# Patient Record
Sex: MALE | Race: WHITE | NOT HISPANIC OR LATINO | URBAN - METROPOLITAN AREA
[De-identification: names, ages, dates, MRNs, and addresses within clinical notes are randomized per-mention and may not be internally consistent; named-entity substitution may affect disease eponyms.]

---

## 2018-02-22 NOTE — PATIENT DISCUSSION
MYOPIA OU- DISC OPT OF REFRACTIVE [id number]. PT UNDERSTANDS OPTIONS AND DESIRES TO PROCEED WITH LASIK OU TO IMPROVE VA AND REDUCE DEPENDENCY ON GLS/CTLS.

## 2019-03-15 NOTE — PATIENT DISCUSSION
It was discussed and explained that monovision is a compromise and that vision will be limited during certain activities. Activities include: driving at night and near tasks that require good depth perception. Recommended observation. Post op 1 week.

## 2020-08-24 ENCOUNTER — OFFICE VISIT (OUTPATIENT)
Dept: URBAN - METROPOLITAN AREA TELEHEALTH 2 | Facility: TELEHEALTH | Age: 70
End: 2020-08-24

## 2020-08-24 ENCOUNTER — OFFICE VISIT (OUTPATIENT)
Dept: URBAN - METROPOLITAN AREA CLINIC 63 | Facility: CLINIC | Age: 70
End: 2020-08-24

## 2020-09-09 ENCOUNTER — OFFICE VISIT (OUTPATIENT)
Dept: URBAN - METROPOLITAN AREA SURGERY CENTER 4 | Facility: SURGERY CENTER | Age: 70
End: 2020-09-09

## 2020-09-11 LAB — PATHOLOGY (INDENTED REPORT): (no result)

## 2020-09-30 ENCOUNTER — OFFICE VISIT (OUTPATIENT)
Dept: URBAN - METROPOLITAN AREA SURGERY CENTER 4 | Facility: SURGERY CENTER | Age: 70
End: 2020-09-30

## 2020-10-20 ENCOUNTER — OFFICE VISIT (OUTPATIENT)
Dept: URBAN - METROPOLITAN AREA TELEHEALTH 2 | Facility: TELEHEALTH | Age: 70
End: 2020-10-20

## 2021-11-16 ENCOUNTER — OFFICE VISIT (OUTPATIENT)
Dept: URBAN - METROPOLITAN AREA CLINIC 121 | Facility: CLINIC | Age: 71
End: 2021-11-16

## 2022-01-24 ENCOUNTER — OFFICE VISIT (OUTPATIENT)
Dept: URBAN - METROPOLITAN AREA CLINIC 63 | Facility: CLINIC | Age: 72
End: 2022-01-24

## 2022-01-27 ENCOUNTER — OFFICE VISIT (OUTPATIENT)
Dept: URBAN - METROPOLITAN AREA TELEHEALTH 2 | Facility: TELEHEALTH | Age: 72
End: 2022-01-27

## 2022-03-23 ENCOUNTER — OFFICE VISIT (OUTPATIENT)
Dept: URBAN - METROPOLITAN AREA SURGERY CENTER 4 | Facility: SURGERY CENTER | Age: 72
End: 2022-03-23

## 2022-04-07 ENCOUNTER — OFFICE VISIT (OUTPATIENT)
Dept: URBAN - METROPOLITAN AREA TELEHEALTH 2 | Facility: TELEHEALTH | Age: 72
End: 2022-04-07

## 2022-05-06 ENCOUNTER — OFFICE VISIT (OUTPATIENT)
Dept: URBAN - METROPOLITAN AREA MEDICAL CENTER 14 | Facility: MEDICAL CENTER | Age: 72
End: 2022-05-06

## 2022-05-19 ENCOUNTER — OFFICE VISIT (OUTPATIENT)
Dept: URBAN - METROPOLITAN AREA TELEHEALTH 2 | Facility: TELEHEALTH | Age: 72
End: 2022-05-19

## 2022-06-16 ENCOUNTER — OFFICE VISIT (OUTPATIENT)
Dept: URBAN - METROPOLITAN AREA TELEHEALTH 2 | Facility: TELEHEALTH | Age: 72
End: 2022-06-16

## 2022-07-09 ENCOUNTER — TELEPHONE ENCOUNTER (OUTPATIENT)
Dept: URBAN - METROPOLITAN AREA CLINIC 121 | Facility: CLINIC | Age: 72
End: 2022-07-09

## 2022-07-09 RX ORDER — FAMOTIDINE 10 MG
TABLET ORAL
Refills: 0 | OUTPATIENT
Start: 2015-09-24 | End: 2017-01-25

## 2022-07-09 RX ORDER — FAMOTIDINE 10 MG
TABLET ORAL
Refills: 0 | OUTPATIENT
Start: 2015-06-08 | End: 2015-07-10

## 2022-07-09 RX ORDER — FLUOXETINE HYDROCHLORIDE 40 MG/1
CAPSULE ORAL ONCE A DAY
Refills: 0 | OUTPATIENT
Start: 2022-01-27 | End: 2022-04-07

## 2022-07-09 RX ORDER — FLUOXETINE HYDROCHLORIDE 20 MG/1
CAPSULE ORAL ONCE A DAY
Refills: 0 | OUTPATIENT
Start: 2017-01-25 | End: 2017-03-06

## 2022-07-09 RX ORDER — FLUOXETINE HCL 10 MG
CAPSULE ORAL
Refills: 0 | OUTPATIENT
Start: 2012-12-04 | End: 2015-04-14

## 2022-07-09 RX ORDER — SIMVASTATIN 20 MG/1
TABLET, FILM COATED ORAL
Refills: 0 | OUTPATIENT
Start: 2015-07-10 | End: 2015-09-24

## 2022-07-09 RX ORDER — FAMOTIDINE 10 MG
TABLET ORAL ONCE A DAY
Refills: 0 | OUTPATIENT
Start: 2017-01-25 | End: 2017-03-06

## 2022-07-09 RX ORDER — SIMVASTATIN 20 MG/1
TABLET, FILM COATED ORAL ONCE A DAY
Refills: 0 | OUTPATIENT
Start: 2017-01-25 | End: 2017-03-06

## 2022-07-09 RX ORDER — OMEPRAZOLE 20 MG/1
TAKE 2 TABS PO QAM 30-60 MINS BEFORE BREAKFAST TABLET, DELAYED RELEASE ORAL
Refills: 3 | OUTPATIENT
Start: 2017-01-25 | End: 2017-01-25

## 2022-07-09 RX ORDER — FAMOTIDINE 20 MG/1
TABLET ORAL ONCE A DAY
Refills: 0 | OUTPATIENT
Start: 2022-04-07 | End: 2022-06-16

## 2022-07-09 RX ORDER — AVOBENZONE, HOMOSALATE, OCTISALATE, OCTOCRYLENE 30; 100; 50; 100 MG/ML; MG/ML; MG/ML; MG/ML
LOTION TOPICAL ONCE A DAY
Refills: 0 | OUTPATIENT
Start: 2017-03-06 | End: 2017-03-23

## 2022-07-09 RX ORDER — OMEPRAZOLE 20 MG/1
TAKE 2 TABS PO QAM 30-60 MINS BEFORE BREAKFAST CAPSULE, DELAYED RELEASE ORAL
Refills: 3 | OUTPATIENT
Start: 2014-04-30 | End: 2014-09-23

## 2022-07-09 RX ORDER — FLUOXETINE HCL 20 MG
CAPSULE ORAL
Refills: 0 | OUTPATIENT
Start: 2015-07-10 | End: 2015-09-24

## 2022-07-09 RX ORDER — OMEPRAZOLE 20 MG/1
TABLET, DELAYED RELEASE ORAL TWICE A DAY
Refills: 0 | OUTPATIENT
Start: 2015-02-07 | End: 2015-07-10

## 2022-07-09 RX ORDER — OMEPRAZOLE 20 MG/1
TABLET, DELAYED RELEASE ORAL TWICE A DAY
Refills: 0 | OUTPATIENT
Start: 2015-07-10 | End: 2015-09-24

## 2022-07-09 RX ORDER — FLUOXETINE HYDROCHLORIDE 20 MG/1
CAPSULE ORAL ONCE A DAY
Refills: 0 | OUTPATIENT
Start: 2017-03-06 | End: 2017-03-23

## 2022-07-09 RX ORDER — FLUOXETINE HCL 20 MG
CAPSULE ORAL
Refills: 0 | OUTPATIENT
Start: 2015-09-24 | End: 2017-01-25

## 2022-07-09 RX ORDER — FLUOXETINE HYDROCHLORIDE 40 MG/1
CAPSULE ORAL
Refills: 0 | OUTPATIENT
Start: 2015-03-14 | End: 2015-06-08

## 2022-07-09 RX ORDER — FLUOXETINE HYDROCHLORIDE 40 MG/1
CAPSULE ORAL ONCE A DAY
Refills: 0 | OUTPATIENT
Start: 2022-04-07 | End: 2022-06-16

## 2022-07-09 RX ORDER — FAMOTIDINE 10 MG
TABLET ORAL ONCE A DAY
Refills: 0 | OUTPATIENT
Start: 2017-03-23 | End: 2017-03-23

## 2022-07-09 RX ORDER — OMEGA-3S/DHA/EPA/FISH OIL 980-1400MG
CAPSULE,DELAYED RELEASE (ENTERIC COATED) ORAL
Refills: 0 | OUTPATIENT
Start: 2015-07-10 | End: 2015-07-10

## 2022-07-09 RX ORDER — FAMOTIDINE 20 MG/1
TABLET ORAL ONCE A DAY
Refills: 0 | OUTPATIENT
Start: 2022-01-27 | End: 2022-04-07

## 2022-07-09 RX ORDER — ASPIRIN 81 MG/1
TABLET, DELAYED RELEASE ORAL ONCE A DAY
Refills: 0 | OUTPATIENT
Start: 2017-03-06 | End: 2017-03-06

## 2022-07-09 RX ORDER — OMEPRAZOLE 20 MG/1
TAKE 2 TABS PO QAM 30-60 MINS BEFORE BREAKFAST CAPSULE, DELAYED RELEASE ORAL
Refills: 3 | OUTPATIENT
Start: 2014-09-23 | End: 2015-06-08

## 2022-07-09 RX ORDER — SIMVASTATIN 20 MG/1
TABLET, FILM COATED ORAL
Refills: 0 | OUTPATIENT
Start: 2015-06-08 | End: 2015-07-10

## 2022-07-09 RX ORDER — FAMOTIDINE 10 MG
TABLET ORAL
Refills: 0 | OUTPATIENT
Start: 2014-04-21 | End: 2015-06-08

## 2022-07-09 RX ORDER — SIMVASTATIN 20 MG/1
TABLET, FILM COATED ORAL ONCE A DAY
Refills: 0 | OUTPATIENT
Start: 2017-03-06 | End: 2017-03-06

## 2022-07-09 RX ORDER — OMEGA-3S/DHA/EPA/FISH OIL 980-1400MG
CAPSULE,DELAYED RELEASE (ENTERIC COATED) ORAL
Refills: 0 | OUTPATIENT
Start: 2015-06-08 | End: 2015-07-10

## 2022-07-09 RX ORDER — FAMOTIDINE 10 MG
TABLET ORAL ONCE A DAY
Refills: 0 | OUTPATIENT
Start: 2017-03-06 | End: 2017-03-23

## 2022-07-09 RX ORDER — OMEPRAZOLE 20 MG/1
TWICE A DAY TABLET, DELAYED RELEASE ORAL TWICE A DAY
Refills: 3 | OUTPATIENT
Start: 2015-08-18 | End: 2017-03-06

## 2022-07-09 RX ORDER — FAMOTIDINE 10 MG/1
TABLET, FILM COATED ORAL ONCE A DAY
Refills: 0 | OUTPATIENT
Start: 2022-01-27 | End: 2022-01-27

## 2022-07-09 RX ORDER — FLUOXETINE HCL 10 MG
CAPSULE ORAL TAKE AS DIRECTED
Refills: 0 | OUTPATIENT
Start: 2011-03-29 | End: 2012-12-04

## 2022-07-09 RX ORDER — ASPIRIN 81 MG/1
TABLET, DELAYED RELEASE ORAL ONCE A DAY
Refills: 0 | OUTPATIENT
Start: 2017-01-25 | End: 2017-03-06

## 2022-07-09 RX ORDER — AVOBENZONE, HOMOSALATE, OCTISALATE, OCTOCRYLENE 30; 100; 50; 100 MG/ML; MG/ML; MG/ML; MG/ML
LOTION TOPICAL ONCE A DAY
Refills: 0 | OUTPATIENT
Start: 2017-03-23 | End: 2018-05-15

## 2022-07-09 RX ORDER — FAMOTIDINE 10 MG/1
TABLET, FILM COATED ORAL ONCE A DAY
Refills: 0 | OUTPATIENT
Start: 2020-08-24 | End: 2022-01-27

## 2022-07-09 RX ORDER — OMEPRAZOLE 20 MG/1
TAKE 2 TABS PO QAM 30-60 MINS BEFORE BREAKFAST CAPSULE, DELAYED RELEASE ORAL
Refills: 3 | OUTPATIENT
Start: 2013-03-27 | End: 2014-04-30

## 2022-07-09 RX ORDER — OMEGA-3S/DHA/EPA/FISH OIL 980-1400MG
CAPSULE,DELAYED RELEASE (ENTERIC COATED) ORAL ONCE A DAY
Refills: 0 | OUTPATIENT
Start: 2015-07-10 | End: 2015-09-24

## 2022-07-09 RX ORDER — FLUOXETINE HCL 20 MG
CAPSULE ORAL
Refills: 0 | OUTPATIENT
Start: 2015-06-08 | End: 2015-07-10

## 2022-07-09 RX ORDER — OMEPRAZOLE MAGNESIUM 10 MG/1
GRANULE, DELAYED RELEASE ORAL ONCE A DAY
Refills: 0 | OUTPATIENT
Start: 2012-01-26 | End: 2012-12-04

## 2022-07-09 RX ORDER — SUCRALFATE 1 G/10ML
TAKE 10ML PO QID, AC & HS X 1 MONTH SUSPENSION ORAL
Refills: 0 | OUTPATIENT
Start: 2015-06-04 | End: 2015-06-08

## 2022-07-09 RX ORDER — HYDROCORTISONE ACETATE 25 MG/1
SUPPOSITORY RECTAL TWICE A DAY
Refills: 0 | OUTPATIENT
Start: 2012-12-03 | End: 2015-04-14

## 2022-07-09 RX ORDER — FLUOXETINE HYDROCHLORIDE 20 MG/1
CAPSULE ORAL ONCE A DAY
Refills: 0 | OUTPATIENT
Start: 2017-03-23 | End: 2018-05-15

## 2022-07-09 RX ORDER — SIMVASTATIN 20 MG/1
TABLET, FILM COATED ORAL
Refills: 0 | OUTPATIENT
Start: 2015-04-14 | End: 2015-06-08

## 2022-07-09 RX ORDER — FAMOTIDINE 10 MG
TABLET ORAL
Refills: 0 | OUTPATIENT
Start: 2015-07-10 | End: 2015-09-24

## 2022-07-09 RX ORDER — ALUMINUM HYDROXIDE AND MAGNESIUM CARBONATE 254; 237.5 MG/5ML; MG/5ML
LIQUID ORAL AS NEEDED
Refills: 0 | OUTPATIENT
Start: 2018-05-15 | End: 2020-08-24

## 2022-07-09 RX ORDER — FAMOTIDINE 10 MG
TABLET ORAL TWICE A DAY
Refills: 0 | OUTPATIENT
Start: 2018-05-15 | End: 2020-08-24

## 2022-07-09 RX ORDER — SUCRALFATE 1 G/10ML
TAKE 10ML PO QID, AC & HS X 1 MONTH SUSPENSION ORAL
Refills: 0 | OUTPATIENT
Start: 2015-06-04 | End: 2015-06-04

## 2022-07-09 RX ORDER — OMEPRAZOLE 40 MG/1
TAKE 1 TAB PO QAM 30-60 MINS BEFORE BREAKFAST CAPSULE, DELAYED RELEASE ORAL ONCE A DAY
Refills: 1 | OUTPATIENT
Start: 2018-07-03 | End: 2018-08-27

## 2022-07-09 RX ORDER — FLUOXETINE HYDROCHLORIDE 20 MG/1
CAPSULE ORAL ONCE A DAY
Refills: 0 | OUTPATIENT
Start: 2018-05-15 | End: 2022-01-27

## 2022-07-09 RX ORDER — MELOXICAM 15 MG
TABLET ORAL ONCE A DAY
Refills: 0 | OUTPATIENT
Start: 2020-08-24 | End: 2022-01-27

## 2022-07-09 RX ORDER — AVOBENZONE, HOMOSALATE, OCTISALATE, OCTOCRYLENE 30; 100; 50; 100 MG/ML; MG/ML; MG/ML; MG/ML
LOTION TOPICAL ONCE A DAY
Refills: 0 | OUTPATIENT
Start: 2017-01-25 | End: 2017-03-06

## 2022-07-10 ENCOUNTER — TELEPHONE ENCOUNTER (OUTPATIENT)
Dept: URBAN - METROPOLITAN AREA CLINIC 121 | Facility: CLINIC | Age: 72
End: 2022-07-10

## 2022-07-10 RX ORDER — SIMVASTATIN 20 MG/1
TABLET, FILM COATED ORAL
Refills: 0 | Status: ACTIVE | COMMUNITY
Start: 2015-09-24

## 2022-07-10 RX ORDER — OMEPRAZOLE 20 MG/1
TAKE 2 TABS QAM 30-60 MINS BEFORE BREAKFAST CAPSULE, DELAYED RELEASE ORAL
Refills: 3 | Status: ACTIVE | COMMUNITY
Start: 2013-01-08

## 2022-07-10 RX ORDER — SIMVASTATIN 10 MG/1
TABLET, FILM COATED ORAL
Refills: 0 | Status: ACTIVE | COMMUNITY
Start: 2014-03-26

## 2022-07-10 RX ORDER — HYDROCORTISONE ACETATE 25 MG/1
INSERT ONE SUPPOSITORY PER RECTUM TWICE DAILY AS NEEDED SUPPOSITORY RECTAL TWICE A DAY
Refills: 1 | Status: ACTIVE | COMMUNITY
Start: 2012-01-26

## 2022-07-10 RX ORDER — METRONIDAZOLE 500 MG/1
TABLET ORAL THREE TIMES A DAY
Refills: 0 | Status: ACTIVE | COMMUNITY
Start: 2012-12-04

## 2022-07-10 RX ORDER — CIPROFLOXACIN HYDROCHLORIDE 500 MG/1
TABLET, FILM COATED ORAL TWICE A DAY
Refills: 0 | Status: ACTIVE | COMMUNITY
Start: 2012-12-04

## 2022-07-10 RX ORDER — OMEPRAZOLE 20 MG/1
TABLET, DELAYED RELEASE ORAL TWICE A DAY
Refills: 0 | Status: ACTIVE | COMMUNITY
Start: 2015-09-24

## 2022-07-10 RX ORDER — OMEPRAZOLE 40 MG/1
TAKE ONE CAPSULE BY MOUTH EVERY MORNING 30-60MINS BEFORE BREAKFAST CAPSULE, DELAYED RELEASE ORAL
Refills: 3 | Status: ACTIVE | COMMUNITY
Start: 2018-08-27

## 2022-07-10 RX ORDER — SUCRALFATE 1 G/10ML
TAKE 10ML AC & HS X 1 MONTH SUSPENSION ORAL
Refills: 0 | Status: ACTIVE | COMMUNITY
Start: 2013-01-08

## 2022-07-10 RX ORDER — FAMOTIDINE 20 MG/1
TABLET ORAL ONCE A DAY
Refills: 0 | Status: ACTIVE | COMMUNITY
Start: 2022-06-16

## 2022-07-10 RX ORDER — FLUOXETINE HYDROCHLORIDE 40 MG/1
CAPSULE ORAL ONCE A DAY
Refills: 0 | Status: ACTIVE | COMMUNITY
Start: 2022-06-16

## 2022-07-10 RX ORDER — OMEPRAZOLE 20 MG/1
TAKE 2 TABS PO QAM 30-60 MINS BEFORE BREAKFAST TABLET, DELAYED RELEASE ORAL
Refills: 3 | Status: ACTIVE | COMMUNITY
Start: 2017-01-25

## 2022-11-02 ENCOUNTER — COMPREHENSIVE EXAM (OUTPATIENT)
Dept: URBAN - METROPOLITAN AREA CLINIC 28 | Facility: CLINIC | Age: 72
End: 2022-11-02

## 2022-11-02 DIAGNOSIS — H52.03: ICD-10-CM

## 2022-11-02 DIAGNOSIS — H25.013: ICD-10-CM

## 2022-11-02 DIAGNOSIS — H02.836: ICD-10-CM

## 2022-11-02 DIAGNOSIS — H02.833: ICD-10-CM

## 2022-11-02 PROCEDURE — 92014 COMPRE OPH EXAM EST PT 1/>: CPT

## 2022-11-02 PROCEDURE — 92015 DETERMINE REFRACTIVE STATE: CPT

## 2022-11-02 ASSESSMENT — VISUAL ACUITY
OD_CC: J1+-1
OS_CC: 20/20
OS_CC: J1+
OD_CC: 20/25-1

## 2022-11-02 ASSESSMENT — KERATOMETRY
OD_K1POWER_DIOPTERS: 46.25
OS_AXISANGLE2_DEGREES: 81
OD_AXISANGLE_DEGREES: 172
OD_K2POWER_DIOPTERS: 45.50
OS_AXISANGLE_DEGREES: 171
OD_AXISANGLE2_DEGREES: 82
OS_K2POWER_DIOPTERS: 45.75
OS_K1POWER_DIOPTERS: 46.25

## 2022-11-02 ASSESSMENT — TONOMETRY
OS_IOP_MMHG: 17
OD_IOP_MMHG: 17

## 2023-05-31 ENCOUNTER — DASHBOARD ENCOUNTERS (OUTPATIENT)
Age: 73
End: 2023-05-31

## 2023-05-31 ENCOUNTER — OFFICE VISIT (OUTPATIENT)
Dept: URBAN - METROPOLITAN AREA CLINIC 63 | Facility: CLINIC | Age: 73
End: 2023-05-31
Payer: MEDICARE

## 2023-05-31 ENCOUNTER — LAB OUTSIDE AN ENCOUNTER (OUTPATIENT)
Dept: URBAN - METROPOLITAN AREA CLINIC 63 | Facility: CLINIC | Age: 73
End: 2023-05-31

## 2023-05-31 ENCOUNTER — WEB ENCOUNTER (OUTPATIENT)
Dept: URBAN - METROPOLITAN AREA CLINIC 63 | Facility: CLINIC | Age: 73
End: 2023-05-31

## 2023-05-31 VITALS
WEIGHT: 166 LBS | BODY MASS INDEX: 24.59 KG/M2 | HEART RATE: 65 BPM | SYSTOLIC BLOOD PRESSURE: 124 MMHG | TEMPERATURE: 97.3 F | HEIGHT: 69 IN | OXYGEN SATURATION: 97 % | DIASTOLIC BLOOD PRESSURE: 80 MMHG

## 2023-05-31 DIAGNOSIS — K22.710 BARRETT'S ESOPHAGUS WITH LOW GRADE DYSPLASIA: ICD-10-CM

## 2023-05-31 PROBLEM — 1082771000119100: Status: ACTIVE | Noted: 2023-05-31

## 2023-05-31 PROCEDURE — 99214 OFFICE O/P EST MOD 30 MIN: CPT | Performed by: NURSE PRACTITIONER

## 2023-05-31 RX ORDER — FAMOTIDINE 20 MG/1
1 TABLET AT BEDTIME AS NEEDED TABLET, FILM COATED ORAL TWICE DAILY
Qty: 180 TABLET | Refills: 3 | OUTPATIENT
Start: 2023-05-31

## 2023-05-31 RX ORDER — FAMOTIDINE 20 MG/1
TABLET ORAL ONCE A DAY
Refills: 0 | Status: ACTIVE | COMMUNITY
Start: 2022-06-16

## 2023-05-31 RX ORDER — FLUOXETINE HYDROCHLORIDE 40 MG/1
CAPSULE ORAL ONCE A DAY
Refills: 0 | Status: ACTIVE | COMMUNITY
Start: 2022-06-16

## 2023-05-31 NOTE — HPI-PREVIOUS PROCEDURES
When last seen we reviewed his EGD performed in May 2022.  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  Mild gastritis was found and the ampulla again appeared prominent.    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 To review, he underwent further evaluation with imaging studies and a CT reported a questionable rounded filling defect in the region of the second portion of the duodenum. Ultimately he underwent EUS. This visualized a 9.2 x 5.4mm hypoechoic lesion in this area, although fine needle aspiration did not reveal any evidence of malignancy.  He was seen at the [facility] in August 2015. He was prepped for a polypectomy, but the endoscopist re-evaluated this with EUS and felt it represented a prominent ampulla and not a lesion. Repeat biopsies did not reveal any evidence of adenomatous tissue.  His last colonoscopy was in March 2022 and this revealed only left-sided diverticulosis and internal hemorrhoids..

## 2023-05-31 NOTE — HPI-TODAY'S VISIT:
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  He was last seen in June 2022.  Today he reports doing very well symptom-wise. He remains on Famotidine 20 mg daily and follows a strict antireflux diet and precautions.  He denies heartburn, reflux or dysphagia.  No appetite change or weight loss. No current bowel problems.   To review, he had been taking a daily PPI until 2018 when his ENT specialist changed him to Zantac 150mg bid. He was taking this until 2020 when he changed to Famotidine 20mg daily. He doesn't know why he hasn't been taking this twice daily or why the PPI was initially changed.

## 2023-06-15 ENCOUNTER — TELEPHONE ENCOUNTER (OUTPATIENT)
Dept: URBAN - METROPOLITAN AREA CLINIC 63 | Facility: CLINIC | Age: 73
End: 2023-06-15

## 2023-06-15 RX ORDER — FAMOTIDINE 20 MG/1
1 TABLET PO TWICE DAILY TABLET, FILM COATED ORAL TWICE DAILY
Qty: 180 TABLET | Refills: 3 | OUTPATIENT
Start: 2023-06-15

## 2023-10-27 NOTE — PHYSICAL EXAM CONSTITUTIONAL:
normal , alert , alert, well hydrated, in no acute distress Comment: Results are not available at this time. Will call patient once results are complete Render Risk Assessment In Note?: no Detail Level: Simple

## 2024-06-19 ENCOUNTER — TELEPHONE ENCOUNTER (OUTPATIENT)
Dept: URBAN - METROPOLITAN AREA CLINIC 64 | Facility: CLINIC | Age: 74
End: 2024-06-19

## 2024-07-09 ENCOUNTER — OFFICE VISIT (OUTPATIENT)
Dept: URBAN - METROPOLITAN AREA CLINIC 63 | Facility: CLINIC | Age: 74
End: 2024-07-09

## 2024-09-04 ENCOUNTER — COMPREHENSIVE EXAM (OUTPATIENT)
Dept: URBAN - METROPOLITAN AREA CLINIC 27 | Facility: CLINIC | Age: 74
End: 2024-09-04

## 2024-09-04 DIAGNOSIS — H35.362: ICD-10-CM

## 2024-09-04 DIAGNOSIS — H43.813: ICD-10-CM

## 2024-09-04 DIAGNOSIS — H25.813: ICD-10-CM

## 2024-09-04 DIAGNOSIS — H10.45: ICD-10-CM

## 2024-09-04 DIAGNOSIS — H52.223: ICD-10-CM

## 2024-09-04 DIAGNOSIS — H02.836: ICD-10-CM

## 2024-09-04 DIAGNOSIS — H02.833: ICD-10-CM

## 2024-09-04 DIAGNOSIS — H04.123: ICD-10-CM

## 2024-09-04 PROCEDURE — 92015 DETERMINE REFRACTIVE STATE: CPT

## 2024-09-04 PROCEDURE — 92134 CPTRZ OPH DX IMG PST SGM RTA: CPT

## 2024-09-04 PROCEDURE — 99214 OFFICE O/P EST MOD 30 MIN: CPT

## 2024-09-04 RX ORDER — PREDNISOLONE ACETATE 10 MG/ML: 1 SUSPENSION/ DROPS OPHTHALMIC

## 2024-09-16 ENCOUNTER — OFFICE VISIT (OUTPATIENT)
Dept: URBAN - METROPOLITAN AREA CLINIC 63 | Facility: CLINIC | Age: 74
End: 2024-09-16
Payer: MEDICARE

## 2024-09-16 VITALS
BODY MASS INDEX: 24.94 KG/M2 | SYSTOLIC BLOOD PRESSURE: 137 MMHG | TEMPERATURE: 97.4 F | WEIGHT: 168.4 LBS | HEART RATE: 58 BPM | HEIGHT: 69 IN | DIASTOLIC BLOOD PRESSURE: 82 MMHG

## 2024-09-16 DIAGNOSIS — K22.710 BARRETT'S ESOPHAGUS WITH LOW GRADE DYSPLASIA: ICD-10-CM

## 2024-09-16 DIAGNOSIS — Z12.11 SCREENING FOR MALIGNANT NEOPLASM OF COLON: ICD-10-CM

## 2024-09-16 PROCEDURE — 99214 OFFICE O/P EST MOD 30 MIN: CPT | Performed by: PHYSICIAN ASSISTANT

## 2024-09-16 RX ORDER — FAMOTIDINE 20 MG/1
1 TABLET AT BEDTIME AS NEEDED TABLET, FILM COATED ORAL TWICE DAILY
Qty: 180 TABLET | Refills: 3 | OUTPATIENT

## 2024-09-16 RX ORDER — FLUOXETINE 40 MG/1
CAPSULE ORAL
Qty: 90 APPLICATOR | Refills: 0 | Status: ACTIVE | COMMUNITY

## 2024-09-16 RX ORDER — FAMOTIDINE 20 MG/1
TABLET, FILM COATED ORAL
Qty: 180 TABLET | Refills: 0 | Status: ACTIVE | COMMUNITY

## 2024-09-16 NOTE — HPI-TODAY'S VISIT:
73-year-old male with depression, OA, GERD, Montana's esophagus presents for follow-up. He is currently using famotidine 20 mg twice a day.  He stopped using PPI in 2018.  He has no GI complaints.  He has no pyrosis, dysphagia, odynophagia, nausea, vomiting, abdominal pain, constipation, diarrhea, rectal bleeding.  He had hives after an EGD in the past which was thought to be due to propofol allergy and he was told he needed to have future procedures in the hospital setting.   EGD 5/6/2022 for surveillance of Montana's esophagus:Prominent ampulla.  Mild gastritis.  Less than 1 cm hiatal hernia with no evidence of Montana's esophagus.  Biopsy of the GE junction demonstrated mild chronic inflammation with intestinal metaplasia.  Surveillance EGD was recommended in 2 years.  Colonoscopy 3/23/2022 for CRC screening:Normal colonoscopy to the cecum with the exception of left-sided diverticulosis and small internal hemorrhoids.  Repeat colonoscopy was recommended in 10 years.

## 2024-09-27 ENCOUNTER — TELEPHONE ENCOUNTER (OUTPATIENT)
Dept: URBAN - METROPOLITAN AREA CLINIC 63 | Facility: CLINIC | Age: 74
End: 2024-09-27

## 2024-09-27 RX ORDER — FAMOTIDINE 20 MG/1
TAKE 1 TABLET TABLET, FILM COATED ORAL TWICE DAILY
Qty: 180 | Refills: 3

## 2024-10-29 ENCOUNTER — CONSULTATION/EVALUATION (OUTPATIENT)
Dept: URBAN - METROPOLITAN AREA CLINIC 29 | Facility: CLINIC | Age: 74
End: 2024-10-29

## 2024-10-29 DIAGNOSIS — H02.831: ICD-10-CM

## 2024-10-29 DIAGNOSIS — H43.813: ICD-10-CM

## 2024-10-29 DIAGNOSIS — H25.813: ICD-10-CM

## 2024-10-29 DIAGNOSIS — H11.153: ICD-10-CM

## 2024-10-29 PROCEDURE — 92136 OPHTHALMIC BIOMETRY: CPT

## 2024-10-29 PROCEDURE — 99204 OFFICE O/P NEW MOD 45 MIN: CPT

## 2024-11-06 ENCOUNTER — P2P PATIENT RECORD (OUTPATIENT)
Age: 74
End: 2024-11-06

## 2024-11-18 ENCOUNTER — SURGERY/PROCEDURE (OUTPATIENT)
Dept: URBAN - METROPOLITAN AREA SURGERY 17 | Facility: SURGERY | Age: 74
End: 2024-11-18

## 2024-11-18 DIAGNOSIS — H25.811: ICD-10-CM

## 2024-11-18 PROCEDURE — 66984 XCAPSL CTRC RMVL W/O ECP: CPT

## 2024-11-19 ENCOUNTER — POST OP/EVAL OF SECOND EYE (OUTPATIENT)
Dept: URBAN - METROPOLITAN AREA CLINIC 29 | Facility: CLINIC | Age: 74
End: 2024-11-19

## 2024-11-19 DIAGNOSIS — Z96.1: ICD-10-CM

## 2024-12-09 ENCOUNTER — POST-OP (OUTPATIENT)
Age: 74
End: 2024-12-09

## 2024-12-09 DIAGNOSIS — Z96.1: ICD-10-CM

## 2024-12-09 DIAGNOSIS — H25.812: ICD-10-CM

## 2024-12-16 ENCOUNTER — POST-OP (OUTPATIENT)
Age: 74
End: 2024-12-16

## 2024-12-16 DIAGNOSIS — Z96.1: ICD-10-CM

## 2024-12-16 PROCEDURE — 99024 POSTOP FOLLOW-UP VISIT: CPT
